# Patient Record
Sex: FEMALE | Race: AMERICAN INDIAN OR ALASKA NATIVE
[De-identification: names, ages, dates, MRNs, and addresses within clinical notes are randomized per-mention and may not be internally consistent; named-entity substitution may affect disease eponyms.]

---

## 2020-02-17 ENCOUNTER — HOSPITAL ENCOUNTER (EMERGENCY)
Dept: HOSPITAL 5 - ED | Age: 70
Discharge: HOME | End: 2020-02-17
Payer: MEDICARE

## 2020-02-17 VITALS — SYSTOLIC BLOOD PRESSURE: 173 MMHG | DIASTOLIC BLOOD PRESSURE: 79 MMHG

## 2020-02-17 DIAGNOSIS — I10: ICD-10-CM

## 2020-02-17 DIAGNOSIS — F17.200: ICD-10-CM

## 2020-02-17 DIAGNOSIS — R07.81: Primary | ICD-10-CM

## 2020-02-17 DIAGNOSIS — Z90.710: ICD-10-CM

## 2020-02-17 NOTE — EMERGENCY DEPARTMENT REPORT
ED Fall HPI





- General


Chief Complaint: Fall


Stated Complaint: FALL IN SHOWER


Time Seen by Provider: 20 10:08


Source: patient


Mode of arrival: Wheelchair


Limitations: No Limitations





- History of Present Illness


Initial Comments: 





This is a 69-year-old -American female who presents to the emergency room

with right-sided rib pain from a slip and fall yesterday.  Patient states she 

slipped and fell in her tub hitting the right side of her ribs.  Reports pain is

worse with she tried a cough.  She denies loss of consciousness, nausea or 

vomiting, palpitations, bruising, or swelling.


MD Complaint: fall


Onset/Timin


-: days(s)


Fall From: standing


When Fall Occurred: 24 hours PTA


Fall Witnessed: no


Place Fall Occurred: home


Loss of Consciousness: none


Prolonged Down Time?: no


Symptoms Prior to Fall: none


Location: chest (Right-sided ribs)


Severity: moderate


Severity scale (0 -10): 10


Quality: aching


Context: tripped/slipped


Associated Symptoms: denies





- Related Data


                                  Previous Rx's











 Medication  Instructions  Recorded  Last Taken  Type


 


Ibuprofen [Motrin 800 MG tab] 800 mg PO Q8HR PRN #20 tablet 20 Unknown Rx











                                    Allergies











Allergy/AdvReac Type Severity Reaction Status Date / Time


 


No Known Allergies Allergy   Unverified 20 09:19














ED Review of Systems


ROS: 


Stated complaint: FALL IN SHOWER


Other details as noted in HPI





Constitutional: denies: chills, fever


Respiratory: denies: cough, shortness of breath, wheezing


Cardiovascular: other (Right-sided rib pain).  denies: chest pain, palpitations


Gastrointestinal: denies: abdominal pain, nausea, diarrhea


Musculoskeletal: denies: back pain, joint swelling, arthralgia


Skin: denies: rash, lesions


Neurological: denies: headache, weakness, paresthesias


Psychiatric: denies: anxiety, depression





ED Past Medical Hx





- Past Medical History


Previous Medical History?: Yes


Hx Hypertension: Yes





- Surgical History


Past Surgical History?: Yes


Additional Surgical History: hysterectomy





- Social History


Smoking Status: Current Every Day Smoker


Substance Use Type: None





- Medications


Home Medications: 


                                Home Medications











 Medication  Instructions  Recorded  Confirmed  Last Taken  Type


 


Ibuprofen [Motrin 800 MG tab] 800 mg PO Q8HR PRN #20 tablet 20  Unknown Rx














ED Physical Exam





- General


Limitations: No Limitations


General appearance: alert, in no apparent distress





- Head


Head exam: Present: atraumatic, normocephalic





- Respiratory


Respiratory exam: Present: normal lung sounds bilaterally, chest wall tenderness

(right 6th and 7th ribs ttp, no erythema, swelling, or obvious deformity).  

Absent: respiratory distress, wheezes, rales, rhonchi, stridor





- Cardiovascular


Cardiovascular Exam: Present: regular rate, normal rhythm.  Absent: systolic 

murmur, diastolic murmur, rubs, gallop





- GI/Abdominal


GI/Abdominal exam: Present: soft, normal bowel sounds.  Absent: distended, 

tenderness, guarding, rebound, rigid, organomegaly





- Extremities Exam


Extremities exam: Present: normal inspection





- Back Exam


Back exam: Present: normal inspection





- Neurological Exam


Neurological exam: Present: alert, oriented X3, normal gait





- Psychiatric


Psychiatric exam: Present: normal affect, normal mood





- Skin


Skin exam: Present: warm, dry, intact, normal color.  Absent: rash





ED Course


                                   Vital Signs











  20





  09:21


 


Temperature 99.0 F


 


Pulse Rate 81


 


Respiratory 22





Rate 


 


Blood Pressure 173/79


 


O2 Sat by Pulse 98





Oximetry 














ED Medical Decision Making





- Radiology Data


Radiology results: report reviewed





CLINICAL DATA: 





fall, rib pain 





TECHNICAL DATA: 





5 views were obtained. 





FINDINGS: 





Soft tissues are well imaged. Bones are intact. No evidence of fracture. No 

obvious pneumothorax. 


 Lungs are clear. 








IMPRESSION: 





Normal imaging of the ribs as noted. 








- Medical Decision Making





69-year-old female complaining of right-sided rib pain from a slip and fall in 

the tub on yesterday.  Patient was examined by me.  Patient is stable.  Vitals 

stable.  Given analgesics while in ER.  Obtained x-ray of ribs with chest.  No 

evidence of fracture and no acute findings.  Given history, exam, and work-up, 

there is low suspicion for rib fracture.  No ecchymosis or erythema on exam.  

Patient instructed of symptoms being self-limiting.  They have been given strict

return precautions for delayed possible symptoms.  Patient discharged with 

prompt follow-up with primary care physician.


Critical care attestation.: 


If time is entered above; I have spent that time in minutes in the direct care 

of this critically ill patient, excluding procedure time.








ED Disposition


Clinical Impression: 


 Rib pain on right side





Disposition: DC-01 TO HOME OR SELFCARE


Is pt being admited?: No


Condition: Stable


Instructions:  Chest Pain (ED), Muscle Strain (ED)


Additional Instructions: 


Use incentive spirometer as instructed 10 times an hour while awake.


Take pain medication every 6-8 hours as needed.


Follow-up with your primary care doctor.  


Return to the emergency room if worsening symptoms.


Prescriptions: 


Ibuprofen [Motrin 800 MG tab] 800 mg PO Q8HR PRN #20 tablet


 PRN Reason: Pain , Severe (7-10)


Referrals: 


JEREMY ALVAREZ MD [Primary Care Provider] - 3-5 Days


Forms:  Accompanied Note


Time of Disposition: 11:17

## 2020-02-17 NOTE — XRAY REPORT
CLINICAL DATA:



fall, rib pain



TECHNICAL DATA:



5 views were obtained.



FINDINGS:



Soft tissues are well imaged. Bones are intact. No evidence of fracture. No obvious pneumothorax. Angel
gs are clear.





IMPRESSION:



Normal imaging of the ribs as noted.







Signer Name: Roland Thakkar MD 

Signed: 2/17/2020 10:00 AM

 Workstation Name: [id number]